# Patient Record
Sex: FEMALE | Race: WHITE | NOT HISPANIC OR LATINO | URBAN - METROPOLITAN AREA
[De-identification: names, ages, dates, MRNs, and addresses within clinical notes are randomized per-mention and may not be internally consistent; named-entity substitution may affect disease eponyms.]

---

## 2020-07-22 ENCOUNTER — BOTOX (OUTPATIENT)
Dept: URBAN - METROPOLITAN AREA CLINIC 38 | Facility: CLINIC | Age: 35
Setting detail: DERMATOLOGY
End: 2020-07-22

## 2020-08-05 ENCOUNTER — CHEMICAL PEEL (OUTPATIENT)
Dept: URBAN - METROPOLITAN AREA CLINIC 38 | Facility: CLINIC | Age: 35
Setting detail: DERMATOLOGY
End: 2020-08-05

## 2022-05-03 ENCOUNTER — INJECTABLE WRINKLE TREATMENT (OUTPATIENT)
Dept: URBAN - METROPOLITAN AREA CLINIC 38 | Facility: CLINIC | Age: 37
Setting detail: DERMATOLOGY
End: 2022-05-03

## 2023-07-07 ENCOUNTER — TELEPHONE (OUTPATIENT)
Dept: PRIMARY CARE | Facility: CLINIC | Age: 38
End: 2023-07-07
Payer: COMMERCIAL

## 2023-07-07 DIAGNOSIS — R25.2 LEG CRAMPS: ICD-10-CM

## 2023-07-07 DIAGNOSIS — G47.00 INSOMNIA, UNSPECIFIED TYPE: ICD-10-CM

## 2023-07-07 DIAGNOSIS — E78.2 MIXED HYPERLIPIDEMIA: Primary | ICD-10-CM

## 2023-07-07 DIAGNOSIS — R25.2 LEG CRAMPS: Primary | ICD-10-CM

## 2023-07-07 NOTE — TELEPHONE ENCOUNTER
Pt states she has b/l  dull calf pain for years. She states she would like to see a specialist (if possible to help figure out why she's experiencing this ) pt states sometimes her calves swell but they are not swollen at this time. Please advise

## 2023-07-07 NOTE — TELEPHONE ENCOUNTER
Patient states she will start with labs and go from there. She states she will get referral if you feel its necessary after labs

## 2023-07-12 LAB
CHOLESTEROL (MG/DL) IN SER/PLAS EXTERNAL: 165 MG/DL
CHOLESTEROL IN HDL (MG/DL) IN SER/PLAS EXTERNAL: 39 MG/DL
CHOLESTEROL IN LDL (MG/DL) IN SERUM OR PLASMA BY CALCULATION EXTERNAL: 101 MG/DL
HEMOGLOBIN A1C/HEMOGLOBIN TOTAL IN BLOOD EXTERNAL: 5.3 %
TRIGLYCERIDE (MG/DL) IN SER/PLAS EXTERNAL: 143 MG/DL

## 2023-07-13 NOTE — TELEPHONE ENCOUNTER
Patient informed and understood. She states she will see cardiovascular and understands additional testing

## 2023-07-13 NOTE — TELEPHONE ENCOUNTER
All labs unremarkable. Would she like to see cardiovascular? They alfred want testing done, just RENAE

## 2023-12-05 DIAGNOSIS — G47.00 INSOMNIA, UNSPECIFIED TYPE: Primary | ICD-10-CM

## 2023-12-05 PROBLEM — L30.9 DERMATITIS: Status: ACTIVE | Noted: 2023-12-05

## 2023-12-05 PROBLEM — M79.604 PAIN IN BOTH LOWER EXTREMITIES: Status: ACTIVE | Noted: 2023-12-05

## 2023-12-05 PROBLEM — K21.9 GERD (GASTROESOPHAGEAL REFLUX DISEASE): Status: ACTIVE | Noted: 2023-12-05

## 2023-12-05 PROBLEM — M79.605 PAIN IN BOTH LOWER EXTREMITIES: Status: ACTIVE | Noted: 2023-12-05

## 2023-12-05 RX ORDER — TRAZODONE HYDROCHLORIDE 100 MG/1
100 TABLET ORAL NIGHTLY
Qty: 90 TABLET | Refills: 0 | Status: SHIPPED | OUTPATIENT
Start: 2023-12-05 | End: 2024-02-02 | Stop reason: SDUPTHER

## 2023-12-05 RX ORDER — OMEPRAZOLE 40 MG/1
CAPSULE, DELAYED RELEASE ORAL
COMMUNITY
Start: 2022-10-25

## 2023-12-05 RX ORDER — TRAZODONE HYDROCHLORIDE 100 MG/1
100 TABLET ORAL NIGHTLY
COMMUNITY
End: 2023-12-05 | Stop reason: SDUPTHER

## 2023-12-05 RX ORDER — CLOTRIMAZOLE AND BETAMETHASONE DIPROPIONATE 10; .64 MG/G; MG/G
CREAM TOPICAL
COMMUNITY
Start: 2023-03-01

## 2024-02-02 ENCOUNTER — OFFICE VISIT (OUTPATIENT)
Dept: PRIMARY CARE | Facility: CLINIC | Age: 39
End: 2024-02-02
Payer: COMMERCIAL

## 2024-02-02 VITALS
HEIGHT: 64 IN | HEART RATE: 68 BPM | WEIGHT: 200 LBS | DIASTOLIC BLOOD PRESSURE: 80 MMHG | BODY MASS INDEX: 34.15 KG/M2 | OXYGEN SATURATION: 98 % | SYSTOLIC BLOOD PRESSURE: 112 MMHG

## 2024-02-02 DIAGNOSIS — E78.2 MIXED HYPERLIPIDEMIA: ICD-10-CM

## 2024-02-02 DIAGNOSIS — R63.5 WEIGHT GAIN: ICD-10-CM

## 2024-02-02 DIAGNOSIS — G47.00 INSOMNIA, UNSPECIFIED TYPE: Primary | ICD-10-CM

## 2024-02-02 DIAGNOSIS — M25.50 ARTHRALGIA, UNSPECIFIED JOINT: ICD-10-CM

## 2024-02-02 DIAGNOSIS — E83.51 HYPOCALCEMIA: ICD-10-CM

## 2024-02-02 PROCEDURE — 99214 OFFICE O/P EST MOD 30 MIN: CPT | Performed by: NURSE PRACTITIONER

## 2024-02-02 PROCEDURE — 1036F TOBACCO NON-USER: CPT | Performed by: NURSE PRACTITIONER

## 2024-02-02 RX ORDER — COPPER 313.4 MG/1
1 INTRAUTERINE DEVICE INTRAUTERINE
COMMUNITY

## 2024-02-02 RX ORDER — TRAZODONE HYDROCHLORIDE 100 MG/1
200 TABLET ORAL NIGHTLY
Qty: 180 TABLET | Refills: 2 | Status: SHIPPED | OUTPATIENT
Start: 2024-02-02

## 2024-02-02 ASSESSMENT — ENCOUNTER SYMPTOMS
ABDOMINAL PAIN: 0
WOUND: 0
PHOTOPHOBIA: 0
CHOKING: 0
FATIGUE: 0
ARTHRALGIAS: 0
WHEEZING: 0
APNEA: 0
COUGH: 0
SORE THROAT: 0
CHILLS: 0
CHEST TIGHTNESS: 0
CONSTIPATION: 0
BLOOD IN STOOL: 0
DIZZINESS: 0
WEAKNESS: 0
DIFFICULTY URINATING: 0
NUMBNESS: 0
DIARRHEA: 0
HEADACHES: 0
SEIZURES: 0
HEMATURIA: 0
NECK PAIN: 0
EYE PAIN: 0
FEVER: 0
UNEXPECTED WEIGHT CHANGE: 1
TROUBLE SWALLOWING: 0
CONFUSION: 0
FLANK PAIN: 0
SPEECH DIFFICULTY: 0
DYSURIA: 0
SLEEP DISTURBANCE: 0
VOMITING: 0
FACIAL ASYMMETRY: 0
JOINT SWELLING: 0
BACK PAIN: 0
FREQUENCY: 0
EYE REDNESS: 0
MYALGIAS: 0
SHORTNESS OF BREATH: 0
PALPITATIONS: 0
ABDOMINAL DISTENTION: 0
NERVOUS/ANXIOUS: 0
NAUSEA: 0

## 2024-02-02 ASSESSMENT — PATIENT HEALTH QUESTIONNAIRE - PHQ9
1. LITTLE INTEREST OR PLEASURE IN DOING THINGS: NOT AT ALL
SUM OF ALL RESPONSES TO PHQ9 QUESTIONS 1 AND 2: 0
2. FEELING DOWN, DEPRESSED OR HOPELESS: NOT AT ALL

## 2024-02-02 NOTE — PROGRESS NOTES
"Subjective   Patient ID: Jaimie Terrazas is a 38 y.o. female who presents for Follow-up ([facility] FU).      HPI:  Presents today for [facility] FU FROM 1/13/24 FOR ABD PAIN. SHE WAS DIAGNOSED WITH UTI AND STATES SHE IS FEELING MUCH BETTER.     WEIGHT GAIN- SHE HAD HER IUD REMOVED BUT STILL UNABLE TO LOSE WEIGHT. GROWING MORE FACIAL HAIR. WILL CHECK HORMONE LEVELS  INSOMNIA- TAKING 200 MG PRN. DOES NOT TAKE EVERY NIGHT. MED REFILL     Visit Vitals  /80   Pulse 68   Ht 1.626 m (5' 4\")   Wt 90.7 kg (200 lb)   SpO2 98%   BMI 34.33 kg/m²   Smoking Status Never   BSA 2.02 m²        Review of Systems   Constitutional:  Positive for unexpected weight change (GAIN). Negative for chills, fatigue and fever.   HENT:  Negative for congestion, ear pain, sore throat and trouble swallowing.    Eyes:  Negative for photophobia, pain, redness and visual disturbance.   Respiratory:  Negative for apnea, cough, choking, chest tightness, shortness of breath and wheezing.    Cardiovascular:  Negative for chest pain, palpitations and leg swelling.   Gastrointestinal:  Negative for abdominal distention, abdominal pain, blood in stool, constipation, diarrhea, nausea and vomiting.   Genitourinary:  Negative for difficulty urinating, dysuria, flank pain, frequency, hematuria and urgency.   Musculoskeletal:  Negative for arthralgias, back pain, gait problem, joint swelling, myalgias and neck pain.   Skin:  Negative for rash and wound.   Neurological:  Negative for dizziness, seizures, syncope, facial asymmetry, speech difficulty, weakness, numbness and headaches.   Psychiatric/Behavioral:  Negative for confusion, sleep disturbance and suicidal ideas. The patient is not nervous/anxious.        Objective     Physical Exam  Constitutional:       Appearance: Normal appearance. She is normal weight.   HENT:      Head: Normocephalic.   Eyes:      Extraocular Movements: Extraocular movements intact.      Conjunctiva/sclera: Conjunctivae normal.     "  Pupils: Pupils are equal, round, and reactive to light.   Cardiovascular:      Rate and Rhythm: Normal rate and regular rhythm.      Pulses: Normal pulses.      Heart sounds: Normal heart sounds.   Pulmonary:      Effort: Pulmonary effort is normal.      Breath sounds: Normal breath sounds.   Musculoskeletal:         General: Normal range of motion.      Cervical back: Normal range of motion.   Skin:     General: Skin is warm and dry.   Neurological:      General: No focal deficit present.      Mental Status: She is alert and oriented to person, place, and time.   Psychiatric:         Mood and Affect: Mood normal.         Behavior: Behavior normal.         Thought Content: Thought content normal.         Judgment: Judgment normal.            Assessment/Plan   Problem List Items Addressed This Visit       Mixed hyperlipidemia    Relevant Orders    TSH with reflex to Free T4 if abnormal    Lipid Panel    Hemoglobin A1C    Folate    CBC and Auto Differential    Comprehensive Metabolic Panel    Insomnia - Primary    Relevant Medications    traZODone (Desyrel) 100 mg tablet    Other Relevant Orders    Iron and TIBC    Ferritin    Folate    Arthralgia    Relevant Orders    Vitamin B12    Magnesium    Uric Acid    JORGITO + DANA Panel    Citrulline Antibody, IgG    C-Reactive Protein    Rheumatoid Factor    Sedimentation Rate    Hypocalcemia    Relevant Orders    Vitamin B12    Magnesium    Vitamin D 25-Hydroxy,Total (for eval of Vitamin D levels)    Parathyroid Hormone, Intact    Weight gain    Relevant Orders    Cortisol    Anti-Mullerian Hormone (AMH)    Androstenedione    Luteinizing Hormone    Follicle Stimulating Hormone    Prolactin    Progesterone    Testosterone,Free and Total    DHEA    Estrogens, Total     WE DISCUSSED MOST COMMON SIDE EFFECTS OF PRESCRIBED MEDICATIONS. INDICATIONS, RISK, COMPLICATIONS, AND ALTERNATIVES OF MEDICATION/THERAPEUTICS WERE EXPLAINED AND DISCUSSED. PLEASE MONITOR CLOSELY FOR ANY UNTOWARD  SIDE EFFECTS OR COMPLICATIONS OF MEDICATIONS. PATIENT IS STRONGLY ADVISED TO BE COMPLIANT WITH RECOMMENDATIONS. QUESTIONS AND CONCERNS WERE ADDRESSED. INSTRUCTED TO CALL, RETURN SOONER, OR GO TO THE ER,  IF SYMPTOMS PERSIST OR WORSEN. THEY VOICED UNDERSTANDING AND  DENIES FURTHER QUESTIONS AT THIS TIME.    TIME CODE  1. PREPARATION FOR PATIENT'S VISIT (REVIEWING CHART, CURRENT MEDICAL RECORDS, OUTSIDE HEALTH PROVIDER RECORDS, PREVIOUS HISTORY, EXAM, TEST, PROCEDURE, AND MEDICATIONS)  2. FACE TO FACE ENCOUNTER OBTAINING HISTORY FROM THE PATIENT/FAMILY/CAREGIVERS; PERFORMING EVALUATION AND EXAMINATION; ORDERING TESTS OR PROCEDURES; REFERRING AND COMMUNICATING WITH OTHER HEALTHCARE PROVIDERS; COUNSELING AND EDUCATION OF THE PATIENT/FAMILY/CAREGIVERS; INDEPENDENTLY INTERPRETING RESULTS (TESTS, LABS, PROCEDURES, IMAGING) AND COMMUNICATING AND EXPLAINING RESULTS TO THE PATIENT/FAMILY/CAREGIVERS  3. COORDINATION OF CARE; PREPARING AND PRINTING DISCHARGE INSTRUCTIONS AND ANY EDUCATIONAL MATERIAL FOR THE PATIENT/FAMILY/CAREGIVERS. DOCUMENTING CLINICAL INFORMATION IN THE ELECTRONIC MEDICAL RECORD   4. REVIEWING OARRS AS NEEDED    MDM  1) COMPLEXITY: 1 ACUTE ILLNESS, 1 STABLE CHRONIC CONDITION, OR 2 MINOR PROBLEMS ADDRESSED   2)DATA: TESTS INTERPRETED AND OR ORDERED, TOOK INDEPENDENT HISTORY OR RECORDS REVIEWED  3)RISK: LOW RISK DUE TO NATURE OF MEDICAL CONDITIONS/COMORBIDITY OR MEDICATIONS ORDERED OR SURGICAL OR PROCEDURE REFERRAL    3 MONTHS WITH LABS PER PATIENT REQUEST

## 2024-03-13 ENCOUNTER — APPOINTMENT (OUTPATIENT)
Dept: PRIMARY CARE | Facility: CLINIC | Age: 39
End: 2024-03-13
Payer: COMMERCIAL

## 2024-04-24 LAB
CHOLESTEROL (MG/DL) IN SER/PLAS EXTERNAL: 192 MG/DL
CHOLESTEROL IN HDL (MG/DL) IN SER/PLAS EXTERNAL: 43 MG/DL
CHOLESTEROL IN LDL (MG/DL) IN SERUM OR PLASMA BY CALCULATION EXTERNAL: 120 MG/DL
ESTIMATED AVERAGE GLUCOSE FOR HBA1C EXTERNAL: 87 MG/DL
HEMOGLOBIN A1C/HEMOGLOBIN TOTAL IN BLOOD EXTERNAL: 5.4 %
TRIGLYCERIDE (MG/DL) IN SER/PLAS EXTERNAL: 166 MG/DL
VLDL EXTERNAL: 29 MG/DL

## 2024-05-02 ENCOUNTER — APPOINTMENT (OUTPATIENT)
Dept: PRIMARY CARE | Facility: CLINIC | Age: 39
End: 2024-05-02
Payer: COMMERCIAL

## 2024-05-14 ENCOUNTER — APPOINTMENT (OUTPATIENT)
Dept: PRIMARY CARE | Facility: CLINIC | Age: 39
End: 2024-05-14
Payer: COMMERCIAL

## 2024-09-13 ENCOUNTER — TELEMEDICINE (OUTPATIENT)
Dept: PRIMARY CARE | Facility: CLINIC | Age: 39
End: 2024-09-13
Payer: COMMERCIAL

## 2024-09-13 VITALS — WEIGHT: 200 LBS | HEIGHT: 64 IN | BODY MASS INDEX: 34.15 KG/M2

## 2024-09-13 DIAGNOSIS — J01.90 ACUTE NON-RECURRENT SINUSITIS, UNSPECIFIED LOCATION: Primary | ICD-10-CM

## 2024-09-13 PROCEDURE — 1036F TOBACCO NON-USER: CPT | Performed by: NURSE PRACTITIONER

## 2024-09-13 PROCEDURE — 99214 OFFICE O/P EST MOD 30 MIN: CPT | Performed by: NURSE PRACTITIONER

## 2024-09-13 PROCEDURE — 3008F BODY MASS INDEX DOCD: CPT | Performed by: NURSE PRACTITIONER

## 2024-09-13 RX ORDER — PREDNISONE 10 MG/1
30 TABLET ORAL DAILY
Qty: 15 TABLET | Refills: 0 | Status: SHIPPED | OUTPATIENT
Start: 2024-09-13

## 2024-09-13 RX ORDER — AZITHROMYCIN 250 MG/1
TABLET, FILM COATED ORAL
Qty: 6 TABLET | Refills: 0 | Status: SHIPPED | OUTPATIENT
Start: 2024-09-13 | End: 2024-09-17

## 2024-09-13 ASSESSMENT — ENCOUNTER SYMPTOMS
SEIZURES: 0
MYALGIAS: 0
BLOOD IN STOOL: 0
ABDOMINAL DISTENTION: 0
SPEECH DIFFICULTY: 0
CONSTIPATION: 0
TROUBLE SWALLOWING: 0
FATIGUE: 0
EYE REDNESS: 0
DIFFICULTY URINATING: 0
SHORTNESS OF BREATH: 0
CONFUSION: 0
CHILLS: 0
JOINT SWELLING: 0
NUMBNESS: 0
APNEA: 0
DIARRHEA: 0
FEVER: 0
PHOTOPHOBIA: 0
BACK PAIN: 0
FREQUENCY: 0
WOUND: 0
CHEST TIGHTNESS: 0
FACIAL ASYMMETRY: 0
ABDOMINAL PAIN: 0
SORE THROAT: 0
WEAKNESS: 0
NERVOUS/ANXIOUS: 0
PALPITATIONS: 0
ARTHRALGIAS: 0
NECK PAIN: 0
SLEEP DISTURBANCE: 0
EYE PAIN: 0
HEADACHES: 1
COUGH: 1
DIZZINESS: 0
UNEXPECTED WEIGHT CHANGE: 0
HEMATURIA: 0
FLANK PAIN: 0
WHEEZING: 0
CHOKING: 0
DYSURIA: 0
VOMITING: 0
NAUSEA: 0

## 2024-09-13 ASSESSMENT — PATIENT HEALTH QUESTIONNAIRE - PHQ9
1. LITTLE INTEREST OR PLEASURE IN DOING THINGS: NOT AT ALL
2. FEELING DOWN, DEPRESSED OR HOPELESS: NOT AT ALL
SUM OF ALL RESPONSES TO PHQ9 QUESTIONS 1 AND 2: 0

## 2024-09-13 NOTE — PROGRESS NOTES
"Subjective   Patient ID: Jaimie Terrazas is a 39 y.o. female who presents for Illness (C/O DRY COUGH FOR 3 WKS WITH NAUSEA BRAIN FOG AND HA. WENT TO URGENT CARE AND GIVEN AMOXICILLIN FOR 7 DAYS AND NO RELIEF ).    VIRTUAL APPOINTMENT BEING PERFORMED    HPI:  Presents today for C/O COUGH X 3 WEEKS  modifying factors consists of NEVER TESTED FOR COVID.  SEEN AT URGENT CARE AND DX WITH EAR INFECTION. associated symptoms consist of NAUSEA. BRAIN FOG, HA.  NO SINUS CONGESTION OR SORE THROAT. NO SOB OR CP prior treatment consists of medication AMOXICILLIN X 7 DAYS     Visit Vitals  Ht 1.626 m (5' 4\")   Wt 90.7 kg (200 lb)   BMI 34.33 kg/m²   Smoking Status Never   BSA 2.02 m²        Review of Systems   Constitutional:  Negative for chills, fatigue, fever and unexpected weight change.   HENT:  Negative for congestion, ear pain, sore throat and trouble swallowing.    Eyes:  Negative for photophobia, pain, redness and visual disturbance.   Respiratory:  Positive for cough. Negative for apnea, choking, chest tightness, shortness of breath and wheezing.    Cardiovascular:  Negative for chest pain, palpitations and leg swelling.   Gastrointestinal:  Negative for abdominal distention, abdominal pain, blood in stool, constipation, diarrhea, nausea and vomiting.   Genitourinary:  Negative for difficulty urinating, dysuria, flank pain, frequency, hematuria and urgency.   Musculoskeletal:  Negative for arthralgias, back pain, gait problem, joint swelling, myalgias and neck pain.   Skin:  Negative for rash and wound.   Neurological:  Positive for headaches. Negative for dizziness, seizures, syncope, facial asymmetry, speech difficulty, weakness and numbness.   Psychiatric/Behavioral:  Negative for confusion, sleep disturbance and suicidal ideas. The patient is not nervous/anxious.        Objective     Physical Exam  Neurological:      Mental Status: She is alert and oriented to person, place, and time.   Psychiatric:         Mood and " Affect: Mood normal.         Behavior: Behavior normal.         Thought Content: Thought content normal.         Judgment: Judgment normal.            Assessment/Plan   Problem List Items Addressed This Visit       Acute non-recurrent sinusitis - Primary    Relevant Medications    azithromycin (Zithromax) 250 mg tablet    predniSONE (Deltasone) 10 mg tablet   REFUSING SOONER FU APPT AT THIS TIME. INCREASE FLUID INTAKE AND TAKE TYLENOL 650 MG PO Q6H/PRN FOR PAIN OR FEVER. RETURN SOONER OR GO TO THE ER IF SYMPTOMS PERSIST OR WORSEN    WE DISCUSSED MOST COMMON SIDE EFFECTS OF PRESCRIBED MEDICATIONS. INDICATIONS, RISK, COMPLICATIONS, AND ALTERNATIVES OF MEDICATION/THERAPEUTICS WERE EXPLAINED AND DISCUSSED. PLEASE MONITOR CLOSELY FOR ANY UNTOWARD SIDE EFFECTS OR COMPLICATIONS OF MEDICATIONS. PATIENT IS STRONGLY ADVISED TO BE COMPLIANT WITH RECOMMENDATIONS. QUESTIONS AND CONCERNS WERE ADDRESSED. INSTRUCTED TO CALL, RETURN SOONER, OR GO TO THE ER,  IF SYMPTOMS PERSIST OR WORSEN. THEY VOICED UNDERSTANDING AND  DENIES FURTHER QUESTIONS AT THIS TIME.    TIME CODE  1. PREPARATION FOR PATIENT'S VISIT (REVIEWING CHART, CURRENT MEDICAL RECORDS, OUTSIDE HEALTH PROVIDER RECORDS, PREVIOUS HISTORY, EXAM, TEST, PROCEDURE, AND MEDICATIONS)  2. FACE TO FACE ENCOUNTER OBTAINING HISTORY FROM THE PATIENT/FAMILY/CAREGIVERS; PERFORMING EVALUATION AND EXAMINATION; ORDERING TESTS OR PROCEDURES; REFERRING AND COMMUNICATING WITH OTHER HEALTHCARE PROVIDERS; COUNSELING AND EDUCATION OF THE PATIENT/FAMILY/CAREGIVERS; INDEPENDENTLY INTERPRETING RESULTS (TESTS, LABS, PROCEDURES, IMAGING) AND COMMUNICATING AND EXPLAINING RESULTS TO THE PATIENT/FAMILY/CAREGIVERS  3. COORDINATION OF CARE; PREPARING AND PRINTING DISCHARGE INSTRUCTIONS AND ANY EDUCATIONAL MATERIAL FOR THE PATIENT/FAMILY/CAREGIVERS. DOCUMENTING CLINICAL INFORMATION IN THE ELECTRONIC MEDICAL RECORD   4. REVIEWING OARRS AS NEEDED    MDM  1) COMPLEXITY: MORE THAN 1 STABLE CHRONIC CONDITION  ADDRESSED OR 1 ACUTE ILLNESS ADDRESSED   2)DATA: TESTS INTERPRETED AND OR ORDERED, TOOK INDEPENDENT HISTORY OR RECORDS REVIEWED  3)RISK: MODERATE RISK DUE TO NATURE OF MEDICAL CONDITIONS/COMORBIDITY OR MEDICATIONS ORDERED OR SURGICAL OR PROCEDURE REFERRAL    Follow up as before

## 2024-09-30 ENCOUNTER — TELEPHONE (OUTPATIENT)
Dept: PRIMARY CARE | Facility: CLINIC | Age: 39
End: 2024-09-30

## 2024-09-30 ENCOUNTER — APPOINTMENT (OUTPATIENT)
Dept: PRIMARY CARE | Facility: CLINIC | Age: 39
End: 2024-09-30
Payer: COMMERCIAL

## 2024-09-30 VITALS
HEIGHT: 64 IN | SYSTOLIC BLOOD PRESSURE: 106 MMHG | WEIGHT: 198 LBS | HEART RATE: 76 BPM | DIASTOLIC BLOOD PRESSURE: 69 MMHG | BODY MASS INDEX: 33.8 KG/M2

## 2024-09-30 DIAGNOSIS — J45.991 ASTHMA, COUGH VARIANT (HHS-HCC): Primary | ICD-10-CM

## 2024-09-30 PROCEDURE — 1036F TOBACCO NON-USER: CPT | Performed by: INTERNAL MEDICINE

## 2024-09-30 PROCEDURE — 99214 OFFICE O/P EST MOD 30 MIN: CPT | Performed by: INTERNAL MEDICINE

## 2024-09-30 PROCEDURE — 3008F BODY MASS INDEX DOCD: CPT | Performed by: INTERNAL MEDICINE

## 2024-09-30 PROCEDURE — 96372 THER/PROPH/DIAG INJ SC/IM: CPT | Performed by: INTERNAL MEDICINE

## 2024-09-30 RX ORDER — ALBUTEROL SULFATE 90 UG/1
2 INHALANT RESPIRATORY (INHALATION) EVERY 6 HOURS PRN
Qty: 8.5 G | Refills: 11 | Status: SHIPPED | OUTPATIENT
Start: 2024-09-30 | End: 2025-09-30

## 2024-09-30 RX ORDER — PREDNISONE 10 MG/1
10 TABLET ORAL 3 TIMES DAILY
Qty: 15 TABLET | Refills: 0 | Status: SHIPPED | OUTPATIENT
Start: 2024-09-30 | End: 2024-10-05

## 2024-09-30 ASSESSMENT — ENCOUNTER SYMPTOMS
AGITATION: 0
GASTROINTESTINAL NEGATIVE: 1
EYE DISCHARGE: 0
FEVER: 0
ENDOCRINE NEGATIVE: 1
CHILLS: 0
DYSURIA: 0
LIGHT-HEADEDNESS: 0
ABDOMINAL PAIN: 0
ADENOPATHY: 0
MUSCULOSKELETAL NEGATIVE: 1
ALLERGIC/IMMUNOLOGIC NEGATIVE: 1
HEMATOLOGIC/LYMPHATIC NEGATIVE: 1
SHORTNESS OF BREATH: 0
BACK PAIN: 0
ABDOMINAL DISTENTION: 0
EYES NEGATIVE: 1
VOMITING: 0
CARDIOVASCULAR NEGATIVE: 1
WHEEZING: 0
NEUROLOGICAL NEGATIVE: 1
PALPITATIONS: 0
JOINT SWELLING: 0
FATIGUE: 1
HEADACHES: 0
DIARRHEA: 0
COUGH: 1
CONSTIPATION: 0
NUMBNESS: 0
CONFUSION: 0
NAUSEA: 0
PSYCHIATRIC NEGATIVE: 1
NECK STIFFNESS: 0

## 2024-09-30 ASSESSMENT — PATIENT HEALTH QUESTIONNAIRE - PHQ9
SUM OF ALL RESPONSES TO PHQ9 QUESTIONS 1 AND 2: 0
2. FEELING DOWN, DEPRESSED OR HOPELESS: NOT AT ALL
1. LITTLE INTEREST OR PLEASURE IN DOING THINGS: NOT AT ALL

## 2024-09-30 NOTE — PROGRESS NOTES
Subjective   Patient ID: Jaimie Terrazas is a 39 y.o. female who presents for Follow-up (Pt co lingering cough x 6 weeks).  Co cough x 6 weeks  Fatigue  No fever no chills  No covid test has been done  Took amoxil 7 days last dose 3 weeks ago  Then z pac and steroid  Felt better but cough continued        Review of Systems   Constitutional:  Positive for fatigue. Negative for chills and fever.   HENT: Negative.  Negative for congestion.    Eyes: Negative.  Negative for discharge.   Respiratory:  Positive for cough. Negative for shortness of breath and wheezing.    Cardiovascular: Negative.  Negative for chest pain, palpitations and leg swelling.   Gastrointestinal: Negative.  Negative for abdominal distention, abdominal pain, constipation, diarrhea, nausea and vomiting.   Endocrine: Negative.    Genitourinary: Negative.  Negative for dysuria and urgency.   Musculoskeletal: Negative.  Negative for back pain, joint swelling and neck stiffness.   Skin: Negative.  Negative for rash.   Allergic/Immunologic: Negative.  Negative for immunocompromised state.   Neurological: Negative.  Negative for light-headedness, numbness and headaches.   Hematological: Negative.  Negative for adenopathy.   Psychiatric/Behavioral: Negative.  Negative for agitation, behavioral problems and confusion.    All other systems reviewed and are negative.      Objective   Physical Exam  Vitals reviewed.   Constitutional:       General: She is not in acute distress.     Appearance: Normal appearance.   HENT:      Head: Normocephalic and atraumatic.      Nose: Nose normal.   Eyes:      Conjunctiva/sclera: Conjunctivae normal.      Pupils: Pupils are equal, round, and reactive to light.   Neck:      Vascular: No carotid bruit.   Cardiovascular:      Rate and Rhythm: Normal rate and regular rhythm.      Pulses: Normal pulses.      Heart sounds:      No gallop.   Pulmonary:      Effort: Pulmonary effort is normal. No respiratory distress.      Breath  "sounds: Normal breath sounds. No wheezing.   Abdominal:      General: Bowel sounds are normal.      Palpations: Abdomen is soft.      Tenderness: There is no abdominal tenderness.   Musculoskeletal:         General: Normal range of motion.      Cervical back: Normal range of motion. No rigidity.   Lymphadenopathy:      Cervical: No cervical adenopathy.   Skin:     General: Skin is warm.      Findings: No rash.   Neurological:      General: No focal deficit present.      Mental Status: She is alert and oriented to person, place, and time.   Psychiatric:         Mood and Affect: Mood normal.         Behavior: Behavior normal.       /69 (BP Location: Right arm, Patient Position: Sitting)   Pulse 76   Ht 1.626 m (5' 4\")   Wt 89.8 kg (198 lb)   BMI 33.99 kg/m²    Hemoglobin A1C External   Date/Time Value Ref Range Status   04/24/2024 11:35 AM 5.4 % Final     Assessment/Plan   Problem List Items Addressed This Visit    None  Visit Diagnoses       Asthma, cough variant ([facility]HS-HCC)    -  Primary    Relevant Medications    albuterol (ProAir HFA) 90 mcg/actuation inhaler    dexAMETHasone (Decadron) injection 4 mg (Start on 9/30/2024  8:45 AM)    predniSONE (Deltasone) 10 mg tablet    Other Relevant Orders    XR chest 2 views    Complete Pulmonary Function Test Pre/Post Bronchodialator (Spirometry Pre/Post/DLCO/Lung Volumes)               MDM    1) COMPLEXITY: 1 UNDIAGNOSED NEW PROBLEM WITH UNCERTAIN PROGNOSIS  2)DATA: TESTS INTERPRETED AND OR ORDERED, TOOK INDEPENDENT HISTORY OR RECORDS REVIEWED  3)RISK: MODERATE RISK DUE TO NATURE OF MEDICAL CONDITIONS/COMORBIDITY OR MEDICATIONS ORDERED OR SURGICAL OR PROCEDURE REFERRAL, .        Fu 3 weeks PFT CXR  "

## 2024-10-21 ENCOUNTER — APPOINTMENT (OUTPATIENT)
Dept: PRIMARY CARE | Facility: CLINIC | Age: 39
End: 2024-10-21
Payer: COMMERCIAL

## 2024-11-13 ENCOUNTER — TELEPHONE (OUTPATIENT)
Dept: PRIMARY CARE | Facility: CLINIC | Age: 39
End: 2024-11-13
Payer: COMMERCIAL

## 2024-11-13 NOTE — TELEPHONE ENCOUNTER
At 9/30/24 appointment you had ordered a PFT for this patient. She had not shown up for the test on 10/3/24. I spoke w/ pt and she states that she does not need to have this test done. I cancelled the active request to schedule. Thanks

## 2024-12-02 ENCOUNTER — APPOINTMENT (OUTPATIENT)
Dept: URBAN - METROPOLITAN AREA CLINIC 204 | Age: 39
Setting detail: DERMATOLOGY
End: 2024-12-02

## 2024-12-02 DIAGNOSIS — Z41.9 ENCOUNTER FOR PROCEDURE FOR PURPOSES OTHER THAN REMEDYING HEALTH STATE, UNSPECIFIED: ICD-10-CM

## 2024-12-02 PROCEDURE — OTHER FILLERS: OTHER

## 2024-12-02 PROCEDURE — OTHER ADDITIONAL NOTES: OTHER

## 2024-12-02 PROCEDURE — OTHER BOTOX (U OR CC): OTHER

## 2024-12-02 PROCEDURE — OTHER INVENTORY: OTHER

## 2024-12-02 ASSESSMENT — LOCATION SIMPLE DESCRIPTION DERM
LOCATION SIMPLE: RIGHT SCALP
LOCATION SIMPLE: RIGHT TEMPLE
LOCATION SIMPLE: RIGHT FOREHEAD
LOCATION SIMPLE: GLABELLA
LOCATION SIMPLE: RIGHT CHEEK
LOCATION SIMPLE: LEFT CHEEK
LOCATION SIMPLE: LEFT TEMPLE
LOCATION SIMPLE: LEFT SCALP
LOCATION SIMPLE: RIGHT EYEBROW
LOCATION SIMPLE: LEFT EYEBROW
LOCATION SIMPLE: SUPERIOR FOREHEAD
LOCATION SIMPLE: LEFT FOREHEAD

## 2024-12-02 ASSESSMENT — LOCATION DETAILED DESCRIPTION DERM
LOCATION DETAILED: LEFT CENTRAL FRONTAL SCALP
LOCATION DETAILED: LEFT LATERAL EYEBROW
LOCATION DETAILED: RIGHT CENTRAL EYEBROW
LOCATION DETAILED: RIGHT LATERAL EYEBROW
LOCATION DETAILED: LEFT SUPERIOR LATERAL MALAR CHEEK
LOCATION DETAILED: LEFT SUPERIOR CENTRAL MALAR CHEEK
LOCATION DETAILED: RIGHT SUPERIOR CENTRAL MALAR CHEEK
LOCATION DETAILED: LEFT INFERIOR LATERAL FOREHEAD
LOCATION DETAILED: LEFT MEDIAL EYEBROW
LOCATION DETAILED: RIGHT LATERAL FOREHEAD
LOCATION DETAILED: RIGHT MEDIAL EYEBROW
LOCATION DETAILED: RIGHT INFERIOR TEMPLE
LOCATION DETAILED: LEFT INFERIOR MEDIAL FOREHEAD
LOCATION DETAILED: GLABELLA
LOCATION DETAILED: SUPERIOR MID FOREHEAD
LOCATION DETAILED: LEFT INFERIOR TEMPLE
LOCATION DETAILED: RIGHT CENTRAL FRONTAL SCALP

## 2024-12-02 ASSESSMENT — LOCATION ZONE DERM
LOCATION ZONE: FACE
LOCATION ZONE: SCALP

## 2024-12-02 NOTE — PROCEDURE: ADDITIONAL NOTES
Render Risk Assessment In Note?: no
Detail Level: Simple
Additional Notes: Discussed with patient that she has a lot of filler migration and explained the option of dissolving filler with Hylenex sooner rather than later. Photo in patients chart.\\n\\nPatient redeemed $20 in [employer]. Discounted syringe of filler to $350. Patient took advantage of buy 40 units get 10 free.

## 2024-12-02 NOTE — PROCEDURE: BOTOX (U OR CC)
Dilution (U/0.1 Cc): 1.1
Detail Level: Detailed
Masseter Units: 0
Including Pricing Information In The Note: No
Consent: Written consent obtained. Risks include but not limited to lid/brow ptosis, bruising, swelling, diplopia, temporary effect, incomplete chemical denervation.
Glabellar Complex Units: 20
Forehead Units/Cc: 10
Document As Units Or Cc?: units
Additional Area 1 Location: upper lip
Post-Care Instructions: Patient instructed to not lie down for 4 hours and limit physical activity for 24 hours.
Quantity Per Injection Site (Units Or Cc): 2 U

## 2024-12-02 NOTE — PROCEDURE: FILLERS
Cheeks Filler Volume In Cc: 0
Map Statment: See Attach Map for Complete Details
Filler: Juvederm Ultra XC
Use Map Statement For Sites (Optional): No
Detail Level: Detailed
Additional Anesthesia Volume In Cc: 6
Anesthesia Volume In Cc: 0.5
Post-Care Instructions: After the procedure, patient instructed to apply ice to reduce swelling.
Consent: Written consent obtained. Risks include but not limited to bruising, beading, irregular texture, ulceration, infection, allergic reaction, scar formation, incomplete augmentation, temporary nature, and procedural pain.
Anesthesia Type: 1% lidocaine with epinephrine
Aspiration Statement: Aspiration was performed prior to injecting site with filler.

## 2025-01-15 DIAGNOSIS — G47.00 INSOMNIA, UNSPECIFIED TYPE: ICD-10-CM

## 2025-01-15 NOTE — TELEPHONE ENCOUNTER
PATIENT IS SCHEDULED FOR 2/12/2025.  CAN YOU PLEASE PUT LABS IN.  PATIENT GOES TO [facility].    WILL FAX WHEN DONE

## 2025-01-15 NOTE — TELEPHONE ENCOUNTER
PT CALLED FOR MED REFILL SHE IS OVERDUE FOR AN APPT SHORT TERM SUPPLY PROPOSED PLEASE EFRAIN OFFICE FOLLOW UP

## 2025-01-17 RX ORDER — TRAZODONE HYDROCHLORIDE 100 MG/1
200 TABLET ORAL NIGHTLY
Qty: 180 TABLET | Refills: 0 | Status: SHIPPED | OUTPATIENT
Start: 2025-01-17

## 2025-01-20 NOTE — TELEPHONE ENCOUNTER
Patient seen in 2024 and was supposed to follow up in 3 months.  She had labs done 2024 but didn't keep follow up appt.  Lab orders in the system is what was already drawn in April.  Do you want to order additional labs to be done before her appt.  (her labs in system  in 2024).      Please advise

## 2025-01-22 NOTE — TELEPHONE ENCOUNTER
FAXED LAB [facility] ORDER FOR A CBC, LIPID, BMP AND HEMAGLOBIN A1C TO BE DONE  PLEASE LET PATIENT KNOW TO DO ONE WEEK PRIOR TO NEXT APPOINTMENT.  THANK YOU.

## 2025-02-12 ENCOUNTER — APPOINTMENT (OUTPATIENT)
Dept: PRIMARY CARE | Facility: CLINIC | Age: 40
End: 2025-02-12
Payer: COMMERCIAL

## 2025-03-06 ENCOUNTER — APPOINTMENT (OUTPATIENT)
Dept: PRIMARY CARE | Facility: CLINIC | Age: 40
End: 2025-03-06
Payer: COMMERCIAL

## 2025-03-06 VITALS
HEART RATE: 79 BPM | DIASTOLIC BLOOD PRESSURE: 67 MMHG | WEIGHT: 193.4 LBS | HEIGHT: 64 IN | SYSTOLIC BLOOD PRESSURE: 104 MMHG | BODY MASS INDEX: 33.02 KG/M2

## 2025-03-06 DIAGNOSIS — G47.00 INSOMNIA, UNSPECIFIED TYPE: ICD-10-CM

## 2025-03-06 DIAGNOSIS — N28.9 FUNCTION KIDNEY DECREASED: Primary | ICD-10-CM

## 2025-03-06 DIAGNOSIS — Z00.00 WELLNESS EXAMINATION: ICD-10-CM

## 2025-03-06 PROBLEM — R63.5 WEIGHT GAIN: Status: RESOLVED | Noted: 2024-02-02 | Resolved: 2025-03-06

## 2025-03-06 PROBLEM — R25.2 LEG CRAMPS: Status: RESOLVED | Noted: 2023-07-07 | Resolved: 2025-03-06

## 2025-03-06 PROBLEM — M79.604 PAIN IN BOTH LOWER EXTREMITIES: Status: RESOLVED | Noted: 2023-12-05 | Resolved: 2025-03-06

## 2025-03-06 PROBLEM — M79.605 PAIN IN BOTH LOWER EXTREMITIES: Status: RESOLVED | Noted: 2023-12-05 | Resolved: 2025-03-06

## 2025-03-06 PROBLEM — J01.90 ACUTE NON-RECURRENT SINUSITIS: Status: RESOLVED | Noted: 2024-09-13 | Resolved: 2025-03-06

## 2025-03-06 PROCEDURE — 3008F BODY MASS INDEX DOCD: CPT | Performed by: NURSE PRACTITIONER

## 2025-03-06 PROCEDURE — 99213 OFFICE O/P EST LOW 20 MIN: CPT | Performed by: NURSE PRACTITIONER

## 2025-03-06 PROCEDURE — 1036F TOBACCO NON-USER: CPT | Performed by: NURSE PRACTITIONER

## 2025-03-06 RX ORDER — TRAZODONE HYDROCHLORIDE 100 MG/1
200 TABLET ORAL NIGHTLY
Qty: 180 TABLET | Refills: 3 | Status: SHIPPED | OUTPATIENT
Start: 2025-03-06

## 2025-03-06 ASSESSMENT — ENCOUNTER SYMPTOMS
WOUND: 0
CONFUSION: 0
HEMATURIA: 0
APNEA: 0
NECK PAIN: 0
DIARRHEA: 0
NERVOUS/ANXIOUS: 0
CONSTIPATION: 0
SORE THROAT: 0
COUGH: 0
CHOKING: 0
JOINT SWELLING: 0
BLOOD IN STOOL: 0
FACIAL ASYMMETRY: 0
NUMBNESS: 0
EYE PAIN: 0
PHOTOPHOBIA: 0
EYE REDNESS: 0
WEAKNESS: 0
SHORTNESS OF BREATH: 0
ABDOMINAL DISTENTION: 0
FEVER: 0
FREQUENCY: 0
WHEEZING: 0
HEADACHES: 0
DIZZINESS: 0
NAUSEA: 0
DYSURIA: 0
TROUBLE SWALLOWING: 0
MYALGIAS: 0
SEIZURES: 0
VOMITING: 0
FATIGUE: 0
SPEECH DIFFICULTY: 0
CHILLS: 0
BACK PAIN: 0
ABDOMINAL PAIN: 0
UNEXPECTED WEIGHT CHANGE: 0
ARTHRALGIAS: 0
FLANK PAIN: 0
PALPITATIONS: 0
DIFFICULTY URINATING: 0
CHEST TIGHTNESS: 0
SLEEP DISTURBANCE: 0

## 2025-03-06 ASSESSMENT — PATIENT HEALTH QUESTIONNAIRE - PHQ9
2. FEELING DOWN, DEPRESSED OR HOPELESS: NOT AT ALL
SUM OF ALL RESPONSES TO PHQ9 QUESTIONS 1 AND 2: 0
1. LITTLE INTEREST OR PLEASURE IN DOING THINGS: NOT AT ALL

## 2025-03-06 NOTE — PROGRESS NOTES
"Subjective   Patient ID: Jaimie Terrazas is a 39 y.o. female who presents for Follow-up (FOLLOW UP, LABS).      HPI:  Presents today for [facility]. NO HA1C DRAWN. STATES SHE WILL GO TODAY OR TOMORROW. NO NEW COMPLAINTS     GFR 53, CREAT 1.31- SHE STATES SHE WAS REALLY  SICK AT THE TIME AND WAS TAKING CREATININE, BUT HAS SINCE STOPPED IT. RECHECK TODAY      INSOMNIA- STABLE. MED REFILL    Visit Vitals  /67   Pulse 79   Ht 1.626 m (5' 4\")   Wt 87.7 kg (193 lb 6.4 oz)   BMI 33.20 kg/m²   Smoking Status Never   BSA 1.99 m²        Review of Systems   Constitutional:  Negative for chills, fatigue, fever and unexpected weight change.   HENT:  Negative for congestion, ear pain, sore throat and trouble swallowing.    Eyes:  Negative for photophobia, pain, redness and visual disturbance.   Respiratory:  Negative for apnea, cough, choking, chest tightness, shortness of breath and wheezing.    Cardiovascular:  Negative for chest pain, palpitations and leg swelling.   Gastrointestinal:  Negative for abdominal distention, abdominal pain, blood in stool, constipation, diarrhea, nausea and vomiting.   Genitourinary:  Negative for difficulty urinating, dysuria, flank pain, frequency, hematuria and urgency.   Musculoskeletal:  Negative for arthralgias, back pain, gait problem, joint swelling, myalgias and neck pain.   Skin:  Negative for rash and wound.   Neurological:  Negative for dizziness, seizures, syncope, facial asymmetry, speech difficulty, weakness, numbness and headaches.   Psychiatric/Behavioral:  Negative for confusion, sleep disturbance and suicidal ideas. The patient is not nervous/anxious.        Objective   SEE SCANNED LABS, REVIEWED    Physical Exam  Constitutional:       Appearance: Normal appearance. She is normal weight.   HENT:      Head: Normocephalic.   Eyes:      Extraocular Movements: Extraocular movements intact.      Conjunctiva/sclera: Conjunctivae normal.      Pupils: Pupils are equal, round, and " reactive to light.   Cardiovascular:      Rate and Rhythm: Normal rate and regular rhythm.      Pulses: Normal pulses.      Heart sounds: Normal heart sounds.   Pulmonary:      Effort: Pulmonary effort is normal.      Breath sounds: Normal breath sounds.   Musculoskeletal:         General: Normal range of motion.      Cervical back: Normal range of motion.   Skin:     General: Skin is warm and dry.   Neurological:      General: No focal deficit present.      Mental Status: She is alert and oriented to person, place, and time.   Psychiatric:         Mood and Affect: Mood normal.         Behavior: Behavior normal.         Thought Content: Thought content normal.         Judgment: Judgment normal.            Assessment/Plan   Problem List Items Addressed This Visit       Insomnia    Relevant Medications    traZODone (Desyrel) 100 mg tablet     Other Visit Diagnoses       Function kidney decreased    -  Primary    Relevant Orders    Basic metabolic panel    Wellness examination        Relevant Orders    CBC and Auto Differential    Comprehensive Metabolic Panel    Hemoglobin A1C    TSH with reflex to Free T4 if abnormal    Lipid Panel    Hemoglobin A1C        WE DISCUSSED MOST COMMON SIDE EFFECTS OF PRESCRIBED MEDICATIONS. INDICATIONS, RISK, COMPLICATIONS, AND ALTERNATIVES OF MEDICATION/THERAPEUTICS WERE EXPLAINED AND DISCUSSED. PLEASE MONITOR CLOSELY FOR ANY UNTOWARD SIDE EFFECTS OR COMPLICATIONS OF MEDICATIONS. PATIENT IS STRONGLY ADVISED TO BE COMPLIANT WITH RECOMMENDATIONS. QUESTIONS AND CONCERNS WERE ADDRESSED. INSTRUCTED TO CALL, RETURN SOONER, OR GO TO THE ER,  IF SYMPTOMS PERSIST OR WORSEN. THEY VOICED UNDERSTANDING AND  DENIES FURTHER QUESTIONS AT THIS TIME.    TIME CODE  1. PREPARATION FOR PATIENT'S VISIT (REVIEWING CHART, CURRENT MEDICAL RECORDS, OUTSIDE HEALTH PROVIDER RECORDS, PREVIOUS HISTORY, EXAM, TEST, PROCEDURE, AND MEDICATIONS)  2. FACE TO FACE ENCOUNTER OBTAINING HISTORY FROM THE  PATIENT/FAMILY/CAREGIVERS; PERFORMING EVALUATION AND EXAMINATION; ORDERING TESTS OR PROCEDURES; REFERRING AND COMMUNICATING WITH OTHER HEALTHCARE PROVIDERS; COUNSELING AND EDUCATION OF THE PATIENT/FAMILY/CAREGIVERS; INDEPENDENTLY INTERPRETING RESULTS (TESTS, LABS, PROCEDURES, IMAGING) AND COMMUNICATING AND EXPLAINING RESULTS TO THE PATIENT/FAMILY/CAREGIVERS  3. COORDINATION OF CARE; PREPARING AND PRINTING DISCHARGE INSTRUCTIONS AND ANY EDUCATIONAL MATERIAL FOR THE PATIENT/FAMILY/CAREGIVERS. DOCUMENTING CLINICAL INFORMATION IN THE ELECTRONIC MEDICAL RECORD   4. REVIEWING OARRS AS NEEDED    MDM  1) COMPLEXITY: 1 ACUTE ILLNESS, 1 STABLE CHRONIC CONDITION, OR 2 MINOR PROBLEMS ADDRESSED   2)DATA: TESTS INTERPRETED AND OR ORDERED, TOOK INDEPENDENT HISTORY OR RECORDS REVIEWED  3)RISK: LOW RISK DUE TO NATURE OF MEDICAL CONDITIONS/COMORBIDITY OR MEDICATIONS ORDERED OR SURGICAL OR PROCEDURE REFERRAL    [facility]

## 2025-03-07 LAB
CHOLESTEROL (MG/DL) IN SER/PLAS EXTERNAL: 155 MG/DL
CHOLESTEROL IN HDL (MG/DL) IN SER/PLAS EXTERNAL: 40 MG/DL
CHOLESTEROL IN LDL (MG/DL) IN SERUM OR PLASMA BY CALCULATION EXTERNAL: 86 MG/DL
TRIGLYCERIDE (MG/DL) IN SER/PLAS EXTERNAL: 166 MG/DL
VLDL EXTERNAL: 29 MG/DL

## 2025-06-17 ENCOUNTER — APPOINTMENT (OUTPATIENT)
Dept: URBAN - METROPOLITAN AREA CLINIC 204 | Age: 40
Setting detail: DERMATOLOGY
End: 2025-06-17

## 2025-06-17 DIAGNOSIS — Z41.9 ENCOUNTER FOR PROCEDURE FOR PURPOSES OTHER THAN REMEDYING HEALTH STATE, UNSPECIFIED: ICD-10-CM

## 2025-06-17 PROCEDURE — OTHER SCITON MOXI: OTHER

## 2025-06-17 PROCEDURE — OTHER SCITON BBL: OTHER

## 2025-06-17 ASSESSMENT — LOCATION SIMPLE DESCRIPTION DERM: LOCATION SIMPLE: RIGHT FOREHEAD

## 2025-06-17 ASSESSMENT — LOCATION DETAILED DESCRIPTION DERM: LOCATION DETAILED: RIGHT INFERIOR MEDIAL FOREHEAD

## 2025-06-17 ASSESSMENT — LOCATION ZONE DERM: LOCATION ZONE: FACE

## 2025-06-24 ENCOUNTER — APPOINTMENT (OUTPATIENT)
Dept: PRIMARY CARE | Facility: CLINIC | Age: 40
End: 2025-06-24
Payer: COMMERCIAL

## 2025-06-25 ENCOUNTER — TELEMEDICINE (OUTPATIENT)
Dept: PRIMARY CARE | Facility: CLINIC | Age: 40
End: 2025-06-25
Payer: COMMERCIAL

## 2025-06-25 VITALS — BODY MASS INDEX: 32.95 KG/M2 | WEIGHT: 193 LBS | HEIGHT: 64 IN

## 2025-06-25 DIAGNOSIS — E87.1 HYPONATREMIA: ICD-10-CM

## 2025-06-25 DIAGNOSIS — N28.9 FUNCTION KIDNEY DECREASED: Primary | ICD-10-CM

## 2025-06-25 PROCEDURE — 3008F BODY MASS INDEX DOCD: CPT | Performed by: NURSE PRACTITIONER

## 2025-06-25 PROCEDURE — 1036F TOBACCO NON-USER: CPT | Performed by: NURSE PRACTITIONER

## 2025-06-25 PROCEDURE — 99213 OFFICE O/P EST LOW 20 MIN: CPT | Performed by: NURSE PRACTITIONER

## 2025-06-25 ASSESSMENT — ENCOUNTER SYMPTOMS
WEAKNESS: 0
DIZZINESS: 0
ABDOMINAL DISTENTION: 0
NUMBNESS: 0
FACIAL ASYMMETRY: 0
ABDOMINAL PAIN: 0
DIFFICULTY URINATING: 0
PHOTOPHOBIA: 0
SPEECH DIFFICULTY: 0
ARTHRALGIAS: 0
VOMITING: 0
FATIGUE: 0
COUGH: 0
CHEST TIGHTNESS: 0
HEMATURIA: 0
SEIZURES: 0
CONSTIPATION: 0
NAUSEA: 0
EYE REDNESS: 0
EYE PAIN: 0
UNEXPECTED WEIGHT CHANGE: 0
JOINT SWELLING: 0
TROUBLE SWALLOWING: 0
DYSURIA: 0
HEADACHES: 0
FEVER: 0
MYALGIAS: 0
SHORTNESS OF BREATH: 0
SLEEP DISTURBANCE: 0
WHEEZING: 0
NECK PAIN: 0
CONFUSION: 0
BACK PAIN: 0
FLANK PAIN: 0
PALPITATIONS: 0
BLOOD IN STOOL: 0
CHOKING: 0
WOUND: 0
DIARRHEA: 0
FREQUENCY: 0
CHILLS: 0
APNEA: 0
NERVOUS/ANXIOUS: 0
SORE THROAT: 0

## 2025-06-25 ASSESSMENT — PATIENT HEALTH QUESTIONNAIRE - PHQ9: 1. LITTLE INTEREST OR PLEASURE IN DOING THINGS: NOT AT ALL

## 2025-06-25 NOTE — PROGRESS NOTES
"Subjective   Patient ID: Jaimie Terrazas is a 40 y.o. female who presents for Follow-up (F/U LABS).  Virtual or Telephone Consent    An interactive audio and video telecommunication system which permits real time communications between the patient (at the originating site) and provider (at the distant site) was utilized to provide this telehealth service.   Verbal consent was requested and obtained from Jaimie Terrazas on this date, 06/25/25 for a telehealth visit and the patient's location was confirmed at the time of the visit.    HPI:  Presents today for [facility] FU. NO NEW COMPLAINTS        KIDNEY FUNCTION DECREASE- IMPROVING  NA+- LOW. LIMIT FLUID INTAKE. DENIES ANY DIZZINESS OR LOC CHANGES.       Visit Vitals  Ht 1.626 m (5' 4\")   Wt 87.5 kg (193 lb)   BMI 33.13 kg/m²   Smoking Status Never   BSA 1.99 m²        Review of Systems   Constitutional:  Negative for chills, fatigue, fever and unexpected weight change.   HENT:  Negative for congestion, ear pain, sore throat and trouble swallowing.    Eyes:  Negative for photophobia, pain, redness and visual disturbance.   Respiratory:  Negative for apnea, cough, choking, chest tightness, shortness of breath and wheezing.    Cardiovascular:  Negative for chest pain, palpitations and leg swelling.   Gastrointestinal:  Negative for abdominal distention, abdominal pain, blood in stool, constipation, diarrhea, nausea and vomiting.   Genitourinary:  Negative for difficulty urinating, dysuria, flank pain, frequency, hematuria and urgency.   Musculoskeletal:  Negative for arthralgias, back pain, gait problem, joint swelling, myalgias and neck pain.   Skin:  Negative for rash and wound.   Neurological:  Negative for dizziness, seizures, syncope, facial asymmetry, speech difficulty, weakness, numbness and headaches.   Psychiatric/Behavioral:  Negative for confusion, sleep disturbance and suicidal ideas. The patient is not nervous/anxious.        Objective   SEE SCANNED " [facility]      CREAT 1.02 COMPARED TO 1.31  GFR 72 COMPARED TO 53  NA+ 132 COMPARED       Physical Exam  Constitutional:       Appearance: Normal appearance.   Neurological:      Mental Status: She is alert and oriented to person, place, and time.   Psychiatric:         Mood and Affect: Mood normal.         Behavior: Behavior normal.         Thought Content: Thought content normal.         Judgment: Judgment normal.            Assessment/Plan   Problem List Items Addressed This Visit       Hyponatremia     Other Visit Diagnoses         Function kidney decreased    -  Primary        PLEASE MONITOR CLOSELY FOR ANY UNTOWARD SIDE EFFECTS OR COMPLICATIONS OF MEDICATIONS. PATIENT IS STRONGLY ADVISED TO BE COMPLIANT WITH RECOMMENDATIONS. QUESTIONS AND CONCERNS WERE ADDRESSED. INSTRUCTED TO CALL, RETURN SOONER, OR GO TO THE ER,  IF SYMPTOMS PERSIST OR WORSEN. THEY VOICED UNDERSTANDING AND  DENIES FURTHER QUESTIONS AT THIS TIME.  TIME CODE  1. PREPARATION FOR PATIENT'S VISIT (REVIEWING CHART, CURRENT MEDICAL RECORDS, OUTSIDE HEALTH PROVIDER RECORDS, PREVIOUS HISTORY, EXAM, TEST, PROCEDURE, AND MEDICATIONS)  2. FACE TO FACE ENCOUNTER OBTAINING HISTORY FROM THE PATIENT/FAMILY/CAREGIVERS; PERFORMING EVALUATION AND EXAMINATION; ORDERING TESTS OR PROCEDURES; REFERRING AND COMMUNICATING WITH OTHER HEALTHCARE PROVIDERS; COUNSELING AND EDUCATION OF THE PATIENT/FAMILY/CAREGIVERS; INDEPENDENTLY INTERPRETING RESULTS (TESTS, LABS, PROCEDURES, IMAGING) AND COMMUNICATING AND EXPLAINING RESULTS TO THE PATIENT/FAMILY/CAREGIVERS  3. COORDINATION OF CARE; PREPARING AND PRINTING DISCHARGE INSTRUCTIONS AND ANY EDUCATIONAL MATERIAL FOR THE PATIENT/FAMILY/CAREGIVERS. DOCUMENTING CLINICAL INFORMATION IN THE ELECTRONIC MEDICAL RECORD   4. REVIEWING OARRS AS NEEDED    MDM  1) COMPLEXITY: MORE THAN 1 STABLE CHRONIC CONDITION ADDRESSED OR 1 ACUTE ILLNESS ADDRESSED   2)DATA: TESTS INTERPRETED AND OR ORDERED, TOOK INDEPENDENT HISTORY OR RECORDS  REVIEWED  3)RISK: MODERATE RISK DUE TO NATURE OF MEDICAL CONDITIONS/COMORBIDITY OR MEDICATIONS ORDERED OR SURGICAL OR PROCEDURE REFERRAL    [facility] WITH LABS

## 2025-07-09 ENCOUNTER — APPOINTMENT (OUTPATIENT)
Dept: URBAN - METROPOLITAN AREA CLINIC 204 | Age: 40
Setting detail: DERMATOLOGY
End: 2025-07-09

## 2025-07-09 DIAGNOSIS — Z41.9 ENCOUNTER FOR PROCEDURE FOR PURPOSES OTHER THAN REMEDYING HEALTH STATE, UNSPECIFIED: ICD-10-CM

## 2025-07-09 PROCEDURE — OTHER ADDITIONAL NOTES: OTHER

## 2025-07-09 PROCEDURE — OTHER INVENTORY: OTHER

## 2025-07-09 PROCEDURE — OTHER BOTOX (U OR CC): OTHER

## 2025-07-09 ASSESSMENT — LOCATION DETAILED DESCRIPTION DERM
LOCATION DETAILED: RIGHT CENTRAL FRONTAL SCALP
LOCATION DETAILED: LEFT SUPERIOR LATERAL MALAR CHEEK
LOCATION DETAILED: RIGHT SUPERIOR CENTRAL MALAR CHEEK
LOCATION DETAILED: RIGHT INFERIOR TEMPLE
LOCATION DETAILED: LEFT CENTRAL FRONTAL SCALP
LOCATION DETAILED: RIGHT LATERAL FOREHEAD
LOCATION DETAILED: LEFT INFERIOR LATERAL FOREHEAD
LOCATION DETAILED: LEFT INFERIOR MEDIAL FOREHEAD
LOCATION DETAILED: LEFT MEDIAL EYEBROW
LOCATION DETAILED: RIGHT MEDIAL EYEBROW
LOCATION DETAILED: GLABELLA
LOCATION DETAILED: LEFT SUPERIOR CENTRAL MALAR CHEEK
LOCATION DETAILED: LEFT LATERAL EYEBROW
LOCATION DETAILED: SUPERIOR MID FOREHEAD
LOCATION DETAILED: RIGHT CENTRAL EYEBROW
LOCATION DETAILED: RIGHT LATERAL EYEBROW
LOCATION DETAILED: LEFT INFERIOR TEMPLE

## 2025-07-09 ASSESSMENT — LOCATION SIMPLE DESCRIPTION DERM
LOCATION SIMPLE: LEFT SCALP
LOCATION SIMPLE: LEFT EYEBROW
LOCATION SIMPLE: RIGHT FOREHEAD
LOCATION SIMPLE: RIGHT EYEBROW
LOCATION SIMPLE: RIGHT TEMPLE
LOCATION SIMPLE: LEFT TEMPLE
LOCATION SIMPLE: LEFT CHEEK
LOCATION SIMPLE: LEFT FOREHEAD
LOCATION SIMPLE: RIGHT SCALP
LOCATION SIMPLE: GLABELLA
LOCATION SIMPLE: RIGHT CHEEK
LOCATION SIMPLE: SUPERIOR FOREHEAD

## 2025-07-09 ASSESSMENT — LOCATION ZONE DERM
LOCATION ZONE: SCALP
LOCATION ZONE: FACE

## 2025-08-15 ENCOUNTER — TELEPHONE (OUTPATIENT)
Dept: PRIMARY CARE | Facility: CLINIC | Age: 40
End: 2025-08-15

## 2025-08-15 ENCOUNTER — APPOINTMENT (OUTPATIENT)
Dept: PRIMARY CARE | Facility: CLINIC | Age: 40
End: 2025-08-15
Payer: COMMERCIAL

## 2025-08-15 DIAGNOSIS — F41.9 ANXIETY: Primary | ICD-10-CM

## 2025-08-15 RX ORDER — BUSPIRONE HYDROCHLORIDE 5 MG/1
5 TABLET ORAL 3 TIMES DAILY PRN
Qty: 90 TABLET | Refills: 2 | Status: SHIPPED | OUTPATIENT
Start: 2025-08-15

## 2026-03-25 ENCOUNTER — APPOINTMENT (OUTPATIENT)
Dept: PRIMARY CARE | Facility: CLINIC | Age: 41
End: 2026-03-25
Payer: COMMERCIAL